# Patient Record
Sex: FEMALE | Race: WHITE | NOT HISPANIC OR LATINO | URBAN - METROPOLITAN AREA
[De-identification: names, ages, dates, MRNs, and addresses within clinical notes are randomized per-mention and may not be internally consistent; named-entity substitution may affect disease eponyms.]

---

## 2018-07-31 ENCOUNTER — INPATIENT (INPATIENT)
Facility: HOSPITAL | Age: 38
LOS: 2 days | Discharge: HOME | End: 2018-08-03
Attending: OBSTETRICS & GYNECOLOGY | Admitting: OBSTETRICS & GYNECOLOGY
Payer: COMMERCIAL

## 2018-07-31 VITALS — HEIGHT: 70 IN | WEIGHT: 182.98 LBS

## 2018-07-31 NOTE — PATIENT PROFILE OB - NS PRO ABUSE SCREEN SUSPICION NEGLECT YN
yes/Tarsorrhaphy of right eye, open CSF leak repair, abdominal fat graft, lumbar drain insertion, 3/6/17 craniotomy with EVD placement no

## 2018-08-01 LAB
BASOPHILS NFR BLD AUTO: 0.2 % — SIGNIFICANT CHANGE UP (ref 0–2)
EOSINOPHIL NFR BLD AUTO: 0.7 % — SIGNIFICANT CHANGE UP (ref 0–6)
HCT VFR BLD CALC: 32.1 % — LOW (ref 34.5–45)
HGB BLD-MCNC: 10.9 G/DL — LOW (ref 11.5–15.5)
LYMPHOCYTES # BLD AUTO: 13.5 % — SIGNIFICANT CHANGE UP (ref 13–44)
MCHC RBC-ENTMCNC: 29.9 PG — SIGNIFICANT CHANGE UP (ref 27–34)
MCHC RBC-ENTMCNC: 34 G/DL — SIGNIFICANT CHANGE UP (ref 32–36)
MCV RBC AUTO: 88.2 FL — SIGNIFICANT CHANGE UP (ref 80–100)
MONOCYTES NFR BLD AUTO: 7.9 % — SIGNIFICANT CHANGE UP (ref 2–14)
NEUTROPHILS NFR BLD AUTO: 77.7 % — HIGH (ref 43–77)
PLATELET # BLD AUTO: 106 K/UL — LOW (ref 150–400)
RBC # BLD: 3.64 M/UL — LOW (ref 3.8–5.2)
RBC # FLD: 13.1 % — SIGNIFICANT CHANGE UP (ref 10.3–16.9)
T PALLIDUM AB TITR SER: NEGATIVE — SIGNIFICANT CHANGE UP
WBC # BLD: 10.2 K/UL — SIGNIFICANT CHANGE UP (ref 3.8–10.5)
WBC # FLD AUTO: 10.2 K/UL — SIGNIFICANT CHANGE UP (ref 3.8–10.5)

## 2018-08-01 RX ORDER — AER TRAVELER 0.5 G/1
1 SOLUTION RECTAL; TOPICAL EVERY 4 HOURS
Qty: 0 | Refills: 0 | Status: DISCONTINUED | OUTPATIENT
Start: 2018-08-01 | End: 2018-08-03

## 2018-08-01 RX ORDER — OXYTOCIN 10 UNIT/ML
41.67 VIAL (ML) INJECTION
Qty: 20 | Refills: 0 | Status: DISCONTINUED | OUTPATIENT
Start: 2018-08-01 | End: 2018-08-03

## 2018-08-01 RX ORDER — DOCUSATE SODIUM 100 MG
100 CAPSULE ORAL
Qty: 0 | Refills: 0 | Status: DISCONTINUED | OUTPATIENT
Start: 2018-08-01 | End: 2018-08-03

## 2018-08-01 RX ORDER — SODIUM CHLORIDE 9 MG/ML
1000 INJECTION, SOLUTION INTRAVENOUS
Qty: 0 | Refills: 0 | Status: DISCONTINUED | OUTPATIENT
Start: 2018-08-01 | End: 2018-08-01

## 2018-08-01 RX ORDER — ACETAMINOPHEN 500 MG
650 TABLET ORAL EVERY 6 HOURS
Qty: 0 | Refills: 0 | Status: DISCONTINUED | OUTPATIENT
Start: 2018-08-01 | End: 2018-08-03

## 2018-08-01 RX ORDER — OXYCODONE AND ACETAMINOPHEN 5; 325 MG/1; MG/1
2 TABLET ORAL EVERY 6 HOURS
Qty: 0 | Refills: 0 | Status: DISCONTINUED | OUTPATIENT
Start: 2018-08-01 | End: 2018-08-03

## 2018-08-01 RX ORDER — DIBUCAINE 1 %
1 OINTMENT (GRAM) RECTAL EVERY 4 HOURS
Qty: 0 | Refills: 0 | Status: DISCONTINUED | OUTPATIENT
Start: 2018-08-01 | End: 2018-08-03

## 2018-08-01 RX ORDER — DINOPROSTONE 10 MG/241MG
10 INSERT VAGINAL ONCE
Qty: 0 | Refills: 0 | Status: COMPLETED | OUTPATIENT
Start: 2018-08-01 | End: 2018-08-01

## 2018-08-01 RX ORDER — HYDROCORTISONE 1 %
1 OINTMENT (GRAM) TOPICAL EVERY 4 HOURS
Qty: 0 | Refills: 0 | Status: DISCONTINUED | OUTPATIENT
Start: 2018-08-01 | End: 2018-08-03

## 2018-08-01 RX ORDER — MAGNESIUM HYDROXIDE 400 MG/1
30 TABLET, CHEWABLE ORAL
Qty: 0 | Refills: 0 | Status: DISCONTINUED | OUTPATIENT
Start: 2018-08-01 | End: 2018-08-03

## 2018-08-01 RX ORDER — OXYTOCIN 10 UNIT/ML
333.33 VIAL (ML) INJECTION
Qty: 20 | Refills: 0 | Status: DISCONTINUED | OUTPATIENT
Start: 2018-08-01 | End: 2018-08-01

## 2018-08-01 RX ORDER — DIPHENHYDRAMINE HCL 50 MG
25 CAPSULE ORAL EVERY 6 HOURS
Qty: 0 | Refills: 0 | Status: DISCONTINUED | OUTPATIENT
Start: 2018-08-01 | End: 2018-08-03

## 2018-08-01 RX ORDER — CITRIC ACID/SODIUM CITRATE 300-500 MG
15 SOLUTION, ORAL ORAL EVERY 4 HOURS
Qty: 0 | Refills: 0 | Status: DISCONTINUED | OUTPATIENT
Start: 2018-08-01 | End: 2018-08-01

## 2018-08-01 RX ORDER — LANOLIN
1 OINTMENT (GRAM) TOPICAL EVERY 6 HOURS
Qty: 0 | Refills: 0 | Status: DISCONTINUED | OUTPATIENT
Start: 2018-08-01 | End: 2018-08-03

## 2018-08-01 RX ORDER — TETANUS TOXOID, REDUCED DIPHTHERIA TOXOID AND ACELLULAR PERTUSSIS VACCINE, ADSORBED 5; 2.5; 8; 8; 2.5 [IU]/.5ML; [IU]/.5ML; UG/.5ML; UG/.5ML; UG/.5ML
0.5 SUSPENSION INTRAMUSCULAR ONCE
Qty: 0 | Refills: 0 | Status: DISCONTINUED | OUTPATIENT
Start: 2018-08-01 | End: 2018-08-03

## 2018-08-01 RX ORDER — IBUPROFEN 200 MG
600 TABLET ORAL EVERY 6 HOURS
Qty: 0 | Refills: 0 | Status: DISCONTINUED | OUTPATIENT
Start: 2018-08-01 | End: 2018-08-03

## 2018-08-01 RX ORDER — SODIUM CHLORIDE 9 MG/ML
1000 INJECTION, SOLUTION INTRAVENOUS ONCE
Qty: 0 | Refills: 0 | Status: COMPLETED | OUTPATIENT
Start: 2018-08-01 | End: 2018-08-01

## 2018-08-01 RX ORDER — GLYCERIN ADULT
1 SUPPOSITORY, RECTAL RECTAL AT BEDTIME
Qty: 0 | Refills: 0 | Status: DISCONTINUED | OUTPATIENT
Start: 2018-08-01 | End: 2018-08-03

## 2018-08-01 RX ORDER — SODIUM CHLORIDE 9 MG/ML
3 INJECTION INTRAMUSCULAR; INTRAVENOUS; SUBCUTANEOUS EVERY 8 HOURS
Qty: 0 | Refills: 0 | Status: DISCONTINUED | OUTPATIENT
Start: 2018-08-01 | End: 2018-08-03

## 2018-08-01 RX ORDER — SIMETHICONE 80 MG/1
80 TABLET, CHEWABLE ORAL EVERY 6 HOURS
Qty: 0 | Refills: 0 | Status: DISCONTINUED | OUTPATIENT
Start: 2018-08-01 | End: 2018-08-03

## 2018-08-01 RX ORDER — PRAMOXINE HYDROCHLORIDE 150 MG/15G
1 AEROSOL, FOAM RECTAL EVERY 4 HOURS
Qty: 0 | Refills: 0 | Status: DISCONTINUED | OUTPATIENT
Start: 2018-08-01 | End: 2018-08-03

## 2018-08-01 RX ADMIN — Medication 125 MILLIUNIT(S)/MIN: at 19:06

## 2018-08-01 RX ADMIN — SODIUM CHLORIDE 125 MILLILITER(S): 9 INJECTION, SOLUTION INTRAVENOUS at 05:11

## 2018-08-01 RX ADMIN — DINOPROSTONE 10 MILLIGRAM(S): 10 INSERT VAGINAL at 01:27

## 2018-08-01 RX ADMIN — SODIUM CHLORIDE 3 MILLILITER(S): 9 INJECTION INTRAMUSCULAR; INTRAVENOUS; SUBCUTANEOUS at 22:00

## 2018-08-01 RX ADMIN — SODIUM CHLORIDE 125 MILLILITER(S): 9 INJECTION, SOLUTION INTRAVENOUS at 14:32

## 2018-08-01 RX ADMIN — SODIUM CHLORIDE 125 MILLILITER(S): 9 INJECTION, SOLUTION INTRAVENOUS at 01:22

## 2018-08-01 RX ADMIN — Medication 600 MILLIGRAM(S): at 20:39

## 2018-08-01 RX ADMIN — SODIUM CHLORIDE 2000 MILLILITER(S): 9 INJECTION, SOLUTION INTRAVENOUS at 09:33

## 2018-08-02 ENCOUNTER — TRANSCRIPTION ENCOUNTER (OUTPATIENT)
Age: 38
End: 2018-08-02

## 2018-08-02 RX ADMIN — Medication 600 MILLIGRAM(S): at 10:09

## 2018-08-02 RX ADMIN — Medication 1 TABLET(S): at 15:02

## 2018-08-02 RX ADMIN — Medication 600 MILLIGRAM(S): at 03:14

## 2018-08-02 RX ADMIN — Medication 600 MILLIGRAM(S): at 09:46

## 2018-08-02 RX ADMIN — Medication 100 MILLIGRAM(S): at 09:48

## 2018-08-02 RX ADMIN — Medication 100 MILLIGRAM(S): at 23:33

## 2018-08-02 RX ADMIN — Medication 600 MILLIGRAM(S): at 15:29

## 2018-08-02 RX ADMIN — Medication 600 MILLIGRAM(S): at 03:03

## 2018-08-02 RX ADMIN — Medication 600 MILLIGRAM(S): at 22:27

## 2018-08-02 RX ADMIN — Medication 600 MILLIGRAM(S): at 15:02

## 2018-08-02 RX ADMIN — Medication 600 MILLIGRAM(S): at 23:28

## 2018-08-02 NOTE — LACTATION INITIAL EVALUATION - NS LACT CON REASON FOR REQ
general questions without assessment/Baby ~19 hrs old, currently in the nursery. Mother reports baby has fed well since delivery. Breastfeeding basics reviewed. Mother declined hands-on help at this time. Encouraged continued skin-to-skin and rooming-in and for the parents to call for further assistance as needed. RN to perform latch check when applicable. Baby 39 GA, 2 urine/2 stool diapers, no recorded weight loss. Mother has h/o breastfeeding previous infant for 1 year with no complications./multiparous mom

## 2018-08-02 NOTE — DISCHARGE NOTE OB - CARE PROVIDER_API CALL
Denice Vaz (), Obstetrics and Gynecology  [address] [place] [address], [place] [address]  Phone: [phone]  Fax: [phone]

## 2018-08-02 NOTE — DISCHARGE NOTE OB - CARE PLAN
Principal Discharge DX:	Normal delivery  Goal:	Normal recovery  Assessment and plan of treatment:	Normal recovery, bonding, nursing

## 2018-08-02 NOTE — PROGRESS NOTE ADULT - ASSESSMENT
A/P yo 38y  s/p , PP# 1, stable  1. Pain: OPM  2. GI: Reg  3. :  Voiding  4. DVT prophylaxis: SCDs, ambulation  5. Dispo: PP#2

## 2018-08-02 NOTE — PROGRESS NOTE ADULT - SUBJECTIVE AND OBJECTIVE BOX
Patient is feeling well. Nursing is going well. No heavy bleeding. Ambulating. Ready to go home tomorrow. See me in the office in 6 weeks.

## 2018-08-02 NOTE — DISCHARGE NOTE OB - PATIENT PORTAL LINK FT
You can access the [facility] Patient Portal, offered by [facility], by registering with the following website: [email or web address]

## 2018-08-02 NOTE — PROGRESS NOTE ADULT - SUBJECTIVE AND OBJECTIVE BOX
Patient evaluated at bedside.   She reports pain is well controlled with motrin and tylenol.    She has been ambulating without assistance, voiding spontaneously, and is breastfeeding.    She denies HA, dizziness, chest pain, palpitations, shortness of breath, n/v, heavy vaginal bleeding or perineal discomfort.    Physical Exam:  Vital Signs Last 24 Hrs  T(C): 37.2 (01 Aug 2018 22:21), Max: 37.4 (01 Aug 2018 20:45)  T(F): 98.9 (01 Aug 2018 22:21), Max: 99.4 (01 Aug 2018 20:45)  HR: 72 (01 Aug 2018 22:21) (69 - 84)  BP: 90/50 (01 Aug 2018 22:21) (90/50 - 111/54)  BP(mean): --  RR: 18 (01 Aug 2018 22:21) (18 - 18)  SpO2: 98% (01 Aug 2018 22:21) (98% - 98%)    GA: NAD, A+0 x 3  Abd: + BS, soft, nontender, nondistended, no rebound or guarding, uterus firm at midline  : lochia WNL  Extremities: no swelling or calf tenderness                          10.9   10.2  )-----------( 106      ( 01 Aug 2018 00:53 )             32.1       MEDICATIONS  (STANDING):  diphtheria/tetanus/pertussis (acellular) Vaccine (ADAcel) 0.5 milliLiter(s) IntraMuscular once  fentaNYL (2 MICROgram(s)/mL) + BUpivacaine 0.1% in 0.9% Sodium Chloride PCEA 250 milliLiter(s) Epidural PCA Continuous  oxytocin Infusion 41.667 milliUNIT(s)/Min (125 mL/Hr) IV Continuous <Continuous>  prenatal multivitamin 1 Tablet(s) Oral daily  sodium chloride 0.9% lock flush 3 milliLiter(s) IV Push every 8 hours    MEDICATIONS  (PRN):  acetaminophen   Tablet 650 milliGRAM(s) Oral every 6 hours PRN For Temp greater than 38.5 C (101.3 F)  acetaminophen   Tablet. 650 milliGRAM(s) Oral every 6 hours PRN Mild Pain  dibucaine 1% Ointment 1 Application(s) Topical every 4 hours PRN Perineal Discomfort  diphenhydrAMINE   Capsule 25 milliGRAM(s) Oral every 6 hours PRN Itching  docusate sodium 100 milliGRAM(s) Oral two times a day PRN Stool Softening  glycerin Suppository - Adult 1 Suppository(s) Rectal at bedtime PRN Constipation  hydrocortisone 1% Cream 1 Application(s) Topical every 4 hours PRN Moderate to Severe Perineal Pain  ibuprofen  Tablet 600 milliGRAM(s) Oral every 6 hours PRN Moderate Pain  lanolin Ointment 1 Application(s) Topical every 6 hours PRN Sore Nipples  magnesium hydroxide Suspension 30 milliLiter(s) Oral two times a day PRN Constipation  oxyCODONE    5 mG/acetaminophen 325 mG 2 Tablet(s) Oral every 6 hours PRN Severe Pain  pramoxine 1%/zinc 5% Cream 1 Application(s) Topical every 4 hours PRN Moderate to Severe Perineal Pain  simethicone 80 milliGRAM(s) Chew every 6 hours PRN Gas  witch hazel Pads 1 Application(s) Topical every 4 hours PRN Perineal Discomfort

## 2018-08-03 VITALS
TEMPERATURE: 99 F | HEART RATE: 93 BPM | OXYGEN SATURATION: 98 % | SYSTOLIC BLOOD PRESSURE: 94 MMHG | RESPIRATION RATE: 18 BRPM | DIASTOLIC BLOOD PRESSURE: 59 MMHG

## 2018-08-03 PROCEDURE — 86850 RBC ANTIBODY SCREEN: CPT

## 2018-08-03 PROCEDURE — 86901 BLOOD TYPING SEROLOGIC RH(D): CPT

## 2018-08-03 PROCEDURE — 36415 COLL VENOUS BLD VENIPUNCTURE: CPT

## 2018-08-03 PROCEDURE — 85025 COMPLETE CBC W/AUTO DIFF WBC: CPT

## 2018-08-03 PROCEDURE — 86900 BLOOD TYPING SEROLOGIC ABO: CPT

## 2018-08-03 PROCEDURE — 86780 TREPONEMA PALLIDUM: CPT

## 2018-08-03 RX ADMIN — Medication 600 MILLIGRAM(S): at 06:43

## 2018-08-03 RX ADMIN — Medication 600 MILLIGRAM(S): at 12:51

## 2018-08-03 RX ADMIN — Medication 100 MILLIGRAM(S): at 12:51

## 2018-08-03 RX ADMIN — Medication 600 MILLIGRAM(S): at 07:24

## 2018-08-03 RX ADMIN — Medication 600 MILLIGRAM(S): at 13:07

## 2018-08-03 NOTE — PROGRESS NOTE ADULT - SUBJECTIVE AND OBJECTIVE BOX
Patient evaluated at bedside.   She reports pain is well controlled with motrin.    She has been ambulating without assistance, voiding spontaneously, and is breastfeeding.    She denies HA, dizziness, chest pain, palpitations, shortness of breath, n/v, heavy vaginal bleeding or perineal discomfort.    Physical Exam:  Vital Signs Last 24 Hrs  T(C): 36.8 (02 Aug 2018 18:09), Max: 36.8 (02 Aug 2018 18:09)  T(F): 98.3 (02 Aug 2018 18:09), Max: 98.3 (02 Aug 2018 18:09)  HR: 88 (02 Aug 2018 18:09) (88 - 88)  BP: 111/56 (02 Aug 2018 18:09) (111/56 - 111/56)  BP(mean): --  RR: 18 (02 Aug 2018 18:09) (18 - 18)  SpO2: 98% (02 Aug 2018 18:09) (98% - 98%)    GA: NAD, A+0 x 3  Abd: + BS, soft, nontender, nondistended, no rebound or guarding, uterus firm at midline 3 fb below umbilicus  : lochia WNL  Extremities: no swelling or calf tenderness    MEDICATIONS  (STANDING):  diphtheria/tetanus/pertussis (acellular) Vaccine (ADAcel) 0.5 milliLiter(s) IntraMuscular once  fentaNYL (2 MICROgram(s)/mL) + BUpivacaine 0.1% in 0.9% Sodium Chloride PCEA 250 milliLiter(s) Epidural PCA Continuous  oxytocin Infusion 41.667 milliUNIT(s)/Min (125 mL/Hr) IV Continuous <Continuous>  prenatal multivitamin 1 Tablet(s) Oral daily  sodium chloride 0.9% lock flush 3 milliLiter(s) IV Push every 8 hours    MEDICATIONS  (PRN):  acetaminophen   Tablet 650 milliGRAM(s) Oral every 6 hours PRN For Temp greater than 38.5 C (101.3 F)  acetaminophen   Tablet. 650 milliGRAM(s) Oral every 6 hours PRN Mild Pain  dibucaine 1% Ointment 1 Application(s) Topical every 4 hours PRN Perineal Discomfort  diphenhydrAMINE   Capsule 25 milliGRAM(s) Oral every 6 hours PRN Itching  docusate sodium 100 milliGRAM(s) Oral two times a day PRN Stool Softening  glycerin Suppository - Adult 1 Suppository(s) Rectal at bedtime PRN Constipation  hydrocortisone 1% Cream 1 Application(s) Topical every 4 hours PRN Moderate to Severe Perineal Pain  ibuprofen  Tablet 600 milliGRAM(s) Oral every 6 hours PRN Moderate Pain  lanolin Ointment 1 Application(s) Topical every 6 hours PRN Sore Nipples  magnesium hydroxide Suspension 30 milliLiter(s) Oral two times a day PRN Constipation  oxyCODONE    5 mG/acetaminophen 325 mG 2 Tablet(s) Oral every 6 hours PRN Severe Pain  pramoxine 1%/zinc 5% Cream 1 Application(s) Topical every 4 hours PRN Moderate to Severe Perineal Pain  simethicone 80 milliGRAM(s) Chew every 6 hours PRN Gas  witch hazel Pads 1 Application(s) Topical every 4 hours PRN Perineal Discomfort

## 2018-08-03 NOTE — PROGRESS NOTE ADULT - ASSESSMENT
A/P yo 38y  s/p , PP#2 , gestational thrmobocytopenia, stable  1. Pain: OPM  2. GI: Reg  3. :  Voiding  4. DVT prophylaxis: SCDs, ambulation  5. Dispo: PP#2

## 2018-08-09 DIAGNOSIS — D69.49 OTHER PRIMARY THROMBOCYTOPENIA: ICD-10-CM

## 2018-08-09 DIAGNOSIS — Z34.83 ENCOUNTER FOR SUPERVISION OF OTHER NORMAL PREGNANCY, THIRD TRIMESTER: ICD-10-CM

## 2018-08-09 DIAGNOSIS — Z3A.39 39 WEEKS GESTATION OF PREGNANCY: ICD-10-CM

## 2022-10-07 NOTE — PATIENT PROFILE OB - PRO ANTIBODY SCREEN
Confirmed with   
Your current Orthopaedic problem we are working together to treat is:  Right knee osteoarthritis.    INJECTION  You have received an injection of Cortisone to your Right knee. Common symptoms of mild swelling and pain may accompany the injection for 24-48 hours. Ice and rest will typically reduce the symptoms. Redness, fever, or intense swelling are not typical. If this occurs, you should call the office for further advice.     It is recommended you schedule a follow-up appointment with Jono Llanos MD in 4 months as needed.    Office hours are 8:00 am to 5:00 pm Monday through Friday. If it is urgent that you speak with someone outside of these hours, our [facility] Call Center will be able to assist you. You can reach the office by calling the:    [facility] [address]  [address] [phone]    We do highly recommend [facility] with [facility], if you do not already have this. You can request access via the internet or by simply talking with a  at any of the clinics.   [email or web address]    Thank you for choosing [facility] as your Orthopedic provider!    
negative

## 2023-10-22 ENCOUNTER — EMERGENCY (EMERGENCY)
Facility: HOSPITAL | Age: 43
LOS: 1 days | Discharge: HOME | End: 2023-10-22
Admitting: EMERGENCY MEDICINE
Payer: COMMERCIAL

## 2023-10-22 ENCOUNTER — NON-APPOINTMENT (OUTPATIENT)
Age: 43
End: 2023-10-22

## 2023-10-22 VITALS
HEART RATE: 97 BPM | DIASTOLIC BLOOD PRESSURE: 73 MMHG | RESPIRATION RATE: 18 BRPM | SYSTOLIC BLOOD PRESSURE: 130 MMHG | WEIGHT: 138.01 LBS | TEMPERATURE: 98 F | OXYGEN SATURATION: 98 % | HEIGHT: 70 IN

## 2023-10-22 VITALS
SYSTOLIC BLOOD PRESSURE: 107 MMHG | RESPIRATION RATE: 18 BRPM | OXYGEN SATURATION: 98 % | HEART RATE: 80 BPM | DIASTOLIC BLOOD PRESSURE: 66 MMHG | TEMPERATURE: 98 F

## 2023-10-22 DIAGNOSIS — R20.2 PARESTHESIA OF SKIN: ICD-10-CM

## 2023-10-22 DIAGNOSIS — R20.0 ANESTHESIA OF SKIN: ICD-10-CM

## 2023-10-22 DIAGNOSIS — D72.829 ELEVATED WHITE BLOOD CELL COUNT, UNSPECIFIED: ICD-10-CM

## 2023-10-22 LAB
ANION GAP SERPL CALC-SCNC: 8 MMOL/L — SIGNIFICANT CHANGE UP (ref 5–17)
ANION GAP SERPL CALC-SCNC: 8 MMOL/L — SIGNIFICANT CHANGE UP (ref 5–17)
BASOPHILS # BLD AUTO: 0.05 K/UL — SIGNIFICANT CHANGE UP (ref 0–0.2)
BASOPHILS # BLD AUTO: 0.05 K/UL — SIGNIFICANT CHANGE UP (ref 0–0.2)
BASOPHILS NFR BLD AUTO: 0.4 % — SIGNIFICANT CHANGE UP (ref 0–2)
BASOPHILS NFR BLD AUTO: 0.4 % — SIGNIFICANT CHANGE UP (ref 0–2)
BUN SERPL-MCNC: 11 MG/DL — SIGNIFICANT CHANGE UP (ref 7–23)
BUN SERPL-MCNC: 11 MG/DL — SIGNIFICANT CHANGE UP (ref 7–23)
CALCIUM SERPL-MCNC: 9.3 MG/DL — SIGNIFICANT CHANGE UP (ref 8.4–10.5)
CALCIUM SERPL-MCNC: 9.3 MG/DL — SIGNIFICANT CHANGE UP (ref 8.4–10.5)
CHLORIDE SERPL-SCNC: 102 MMOL/L — SIGNIFICANT CHANGE UP (ref 96–108)
CHLORIDE SERPL-SCNC: 102 MMOL/L — SIGNIFICANT CHANGE UP (ref 96–108)
CO2 SERPL-SCNC: 24 MMOL/L — SIGNIFICANT CHANGE UP (ref 22–31)
CO2 SERPL-SCNC: 24 MMOL/L — SIGNIFICANT CHANGE UP (ref 22–31)
CREAT SERPL-MCNC: 0.7 MG/DL — SIGNIFICANT CHANGE UP (ref 0.5–1.3)
CREAT SERPL-MCNC: 0.7 MG/DL — SIGNIFICANT CHANGE UP (ref 0.5–1.3)
EGFR: 111 ML/MIN/1.73M2 — SIGNIFICANT CHANGE UP
EGFR: 111 ML/MIN/1.73M2 — SIGNIFICANT CHANGE UP
EOSINOPHIL # BLD AUTO: 0.01 K/UL — SIGNIFICANT CHANGE UP (ref 0–0.5)
EOSINOPHIL # BLD AUTO: 0.01 K/UL — SIGNIFICANT CHANGE UP (ref 0–0.5)
EOSINOPHIL NFR BLD AUTO: 0.1 % — SIGNIFICANT CHANGE UP (ref 0–6)
EOSINOPHIL NFR BLD AUTO: 0.1 % — SIGNIFICANT CHANGE UP (ref 0–6)
GLUCOSE SERPL-MCNC: 129 MG/DL — HIGH (ref 70–99)
GLUCOSE SERPL-MCNC: 129 MG/DL — HIGH (ref 70–99)
HCT VFR BLD CALC: 38.5 % — SIGNIFICANT CHANGE UP (ref 34.5–45)
HCT VFR BLD CALC: 38.5 % — SIGNIFICANT CHANGE UP (ref 34.5–45)
HGB BLD-MCNC: 13.5 G/DL — SIGNIFICANT CHANGE UP (ref 11.5–15.5)
HGB BLD-MCNC: 13.5 G/DL — SIGNIFICANT CHANGE UP (ref 11.5–15.5)
IMM GRANULOCYTES NFR BLD AUTO: 0.3 % — SIGNIFICANT CHANGE UP (ref 0–0.9)
IMM GRANULOCYTES NFR BLD AUTO: 0.3 % — SIGNIFICANT CHANGE UP (ref 0–0.9)
LYMPHOCYTES # BLD AUTO: 0.68 K/UL — LOW (ref 1–3.3)
LYMPHOCYTES # BLD AUTO: 0.68 K/UL — LOW (ref 1–3.3)
LYMPHOCYTES # BLD AUTO: 6.1 % — LOW (ref 13–44)
LYMPHOCYTES # BLD AUTO: 6.1 % — LOW (ref 13–44)
MAGNESIUM SERPL-MCNC: 1.8 MG/DL — SIGNIFICANT CHANGE UP (ref 1.6–2.6)
MAGNESIUM SERPL-MCNC: 1.8 MG/DL — SIGNIFICANT CHANGE UP (ref 1.6–2.6)
MCHC RBC-ENTMCNC: 31.3 PG — SIGNIFICANT CHANGE UP (ref 27–34)
MCHC RBC-ENTMCNC: 31.3 PG — SIGNIFICANT CHANGE UP (ref 27–34)
MCHC RBC-ENTMCNC: 35.1 GM/DL — SIGNIFICANT CHANGE UP (ref 32–36)
MCHC RBC-ENTMCNC: 35.1 GM/DL — SIGNIFICANT CHANGE UP (ref 32–36)
MCV RBC AUTO: 89.1 FL — SIGNIFICANT CHANGE UP (ref 80–100)
MCV RBC AUTO: 89.1 FL — SIGNIFICANT CHANGE UP (ref 80–100)
MONOCYTES # BLD AUTO: 0.38 K/UL — SIGNIFICANT CHANGE UP (ref 0–0.9)
MONOCYTES # BLD AUTO: 0.38 K/UL — SIGNIFICANT CHANGE UP (ref 0–0.9)
MONOCYTES NFR BLD AUTO: 3.4 % — SIGNIFICANT CHANGE UP (ref 2–14)
MONOCYTES NFR BLD AUTO: 3.4 % — SIGNIFICANT CHANGE UP (ref 2–14)
NEUTROPHILS # BLD AUTO: 10.01 K/UL — HIGH (ref 1.8–7.4)
NEUTROPHILS # BLD AUTO: 10.01 K/UL — HIGH (ref 1.8–7.4)
NEUTROPHILS NFR BLD AUTO: 89.7 % — HIGH (ref 43–77)
NEUTROPHILS NFR BLD AUTO: 89.7 % — HIGH (ref 43–77)
NRBC # BLD: 0 /100 WBCS — SIGNIFICANT CHANGE UP (ref 0–0)
NRBC # BLD: 0 /100 WBCS — SIGNIFICANT CHANGE UP (ref 0–0)
PLATELET # BLD AUTO: 152 K/UL — SIGNIFICANT CHANGE UP (ref 150–400)
PLATELET # BLD AUTO: 152 K/UL — SIGNIFICANT CHANGE UP (ref 150–400)
POTASSIUM SERPL-MCNC: 4.1 MMOL/L — SIGNIFICANT CHANGE UP (ref 3.5–5.3)
POTASSIUM SERPL-MCNC: 4.1 MMOL/L — SIGNIFICANT CHANGE UP (ref 3.5–5.3)
POTASSIUM SERPL-SCNC: 4.1 MMOL/L — SIGNIFICANT CHANGE UP (ref 3.5–5.3)
POTASSIUM SERPL-SCNC: 4.1 MMOL/L — SIGNIFICANT CHANGE UP (ref 3.5–5.3)
RBC # BLD: 4.32 M/UL — SIGNIFICANT CHANGE UP (ref 3.8–5.2)
RBC # BLD: 4.32 M/UL — SIGNIFICANT CHANGE UP (ref 3.8–5.2)
RBC # FLD: 12.7 % — SIGNIFICANT CHANGE UP (ref 10.3–14.5)
RBC # FLD: 12.7 % — SIGNIFICANT CHANGE UP (ref 10.3–14.5)
SODIUM SERPL-SCNC: 134 MMOL/L — LOW (ref 135–145)
SODIUM SERPL-SCNC: 134 MMOL/L — LOW (ref 135–145)
WBC # BLD: 11.16 K/UL — HIGH (ref 3.8–10.5)
WBC # BLD: 11.16 K/UL — HIGH (ref 3.8–10.5)
WBC # FLD AUTO: 11.16 K/UL — HIGH (ref 3.8–10.5)
WBC # FLD AUTO: 11.16 K/UL — HIGH (ref 3.8–10.5)

## 2023-10-22 PROCEDURE — 36415 COLL VENOUS BLD VENIPUNCTURE: CPT

## 2023-10-22 PROCEDURE — 85025 COMPLETE CBC W/AUTO DIFF WBC: CPT

## 2023-10-22 PROCEDURE — 93010 ELECTROCARDIOGRAM REPORT: CPT

## 2023-10-22 PROCEDURE — 80048 BASIC METABOLIC PNL TOTAL CA: CPT

## 2023-10-22 PROCEDURE — 99283 EMERGENCY DEPT VISIT LOW MDM: CPT | Mod: 25

## 2023-10-22 PROCEDURE — 93005 ELECTROCARDIOGRAM TRACING: CPT

## 2023-10-22 PROCEDURE — 99284 EMERGENCY DEPT VISIT MOD MDM: CPT

## 2023-10-22 PROCEDURE — 83735 ASSAY OF MAGNESIUM: CPT

## 2023-10-22 NOTE — ED PROVIDER NOTE - PATIENT PORTAL LINK FT
You can access the FollowMyHealth Patient Portal offered by [facility] by registering at the following website: [email or web address]. By joining [facility]’s FollowMyHealth portal, you will also be able to view your health information using other applications (apps) compatible with our system.

## 2023-10-22 NOTE — ED PROVIDER NOTE - CARE PROVIDER_API CALL
Diana Conrad  Neurology  [address] [address]  Phone: [phone]  Fax: [phone]  Follow Up Time:

## 2023-10-22 NOTE — ED PROVIDER NOTE - CLINICAL SUMMARY MEDICAL DECISION MAKING FREE TEXT BOX
43-year-old female with no past medical history complaining of bilateral hands and feet heaviness/numbness over the past month.   Patient states when she wakes up in the morning she usually feels a heaviness in her hands and eventually goes away.  This morning she felt the numbness that was not going away and started to feel anxious her heart was racing and racing she got palpitations she felt nauseous and lightheadedness. Pts  gave her 0.5mg of xanax and after about 45 min she felt improvement. Denies fever, chills, cp, sob, n/v, dizziness. VSS. Exam unremarkable. Electrolytes normal. mildly elevated wbc, pt without infectious symptoms. EKG NSR. ?anxiety related. will refer to pcp and neuro 43-year-old female with no past medical history complaining of bilateral hands and feet heaviness/numbness over the past month.   Patient states when she wakes up in the morning she usually feels a heaviness in her hands and eventually goes away.  This morning she felt the numbness that was not going away and started to feel anxious her heart was racing and racing she got palpitations she felt nauseous and lightheadedness. Pts  gave her 0.5mg of xanax and after about 45 min she felt improvement. Denies fever, chills, cp, sob, n/v, dizziness. VSS. Exam unremarkable. Electrolytes normal. mildly elevated wbc, pt without infectious symptoms. EKG NSR. pt pt refusing CXR. ?anxiety related. will refer to pcp and neuro

## 2023-10-22 NOTE — ED ADULT NURSE NOTE - OBJECTIVE STATEMENT
Pt co numbness to b/l hands and feet x multiple wks, worsening today. Pt reports feeling anxious, took 0.5mg xanax PTA- still feeling anxious. denies headache, unilateral weakness, unsteady gait or decreased sensation.

## 2023-10-22 NOTE — ED PROVIDER NOTE - NSFOLLOWUPINSTRUCTIONS_ED_ALL_ED_FT
aresthesia  Paresthesia is an abnormal burning or prickling sensation. It is usually felt in the hands, arms, legs, or feet. However, it may occur in any part of the body. Usually, paresthesia is not painful. It may feel like:  Tingling or numbness.  Buzzing.  Itching.  Paresthesia may occur without any clear cause, or it may be caused by:  Breathing too quickly (hyperventilation).  Pressure on a nerve.  An underlying medical condition.  Side effects of a medicine.  Nutritional deficiencies.  Exposure to toxic chemicals.  Most people experience temporary (transient) paresthesia at some time in their lives. For some people, it may be long-lasting (chronic) because of an underlying medical condition. If you have paresthesia that lasts a long time, you need to be evaluated by your health care provider.    Follow these instructions at home:  Nutrition    A plate with examples of foods in a healthy diet.  Eat a healthy diet. This includes:  Eating foods that are high in fiber, such as beans, whole grains, and fresh fruits and vegetables.  Limiting foods that are high in fat and processed sugars, such as fried or sweet foods.  Alcohol use    A sign showing that a person should not drink alcohol.  Avoid or limit alcohol. Too much alcohol can cause a vitamin B deficiency, and vitamin B is needed for healthy nerves.  Do not drink alcohol if:  Your health care provider tells you not to drink.  You are pregnant, may be pregnant, or are planning to become pregnant.  If you drink alcohol:  Limit how much you have to:  0–1 drink a day for women.  0–2 drinks a day for men.  Know how much alcohol is in your drink. In the [place] one drink equals one 12 oz bottle of beer (355 mL), one 5 oz glass of wine (148 mL), or one 1½ oz glass of hard liquor (44 mL).  General instructions    Take over-the-counter and prescription medicines only as told by your health care provider.  Do not use any products that contain nicotine or tobacco. These products include cigarettes, chewing tobacco, and vaping devices, such as e-cigarettes. If you need help quitting, ask your health care provider.  If you have diabetes, work closely with your health care provider to keep your blood sugar under control.  If you have numbness in your feet:  Check every day for signs of injury or infection. Watch for redness, warmth, and swelling.  Wear padded socks and comfortable shoes. These help protect your feet.  Keep all follow-up visits. This is important.  Contact a health care provider if you:  Have paresthesia that gets worse or does not go away.  Have numbness after an injury.  Have a burning or prickling feeling that gets worse when you walk.  Have pain, cramps, or dizziness, or you faint.  Develop a rash.  Get help right away if you:  Feel muscle weakness.  Develop new weakness in an arm or leg.  Have trouble walking or moving.  Have problems with speech, understanding, or vision.  Feel confused.  Cannot control your bladder or bowel movements.  These symptoms may be an emergency. Get help right away. Call 911.  Do not wait to see if the symptoms will go away.  Do not drive yourself to the hospital.  Summary  Paresthesia is an abnormal burning or prickling sensation that is usually felt in the hands, arms, legs, or feet. It may also occur in other parts of the body.  Paresthesia may occur without any clear cause, or it may be caused by breathing too quickly (hyperventilation), pressure on a nerve, an underlying medical condition, side effects of a medicine, nutritional deficiencies, or exposure to toxic chemicals.  If you have paresthesia that lasts a long time, you need to be evaluated by your health care provider.  This information is not intended to replace advice given to you by your health care provider. Make sure you discuss any questions you have with your health care provider.    Document Revised: 08/29/2022 Document Reviewed: 08/29/2022

## 2023-10-22 NOTE — ED ADULT TRIAGE NOTE - CHIEF COMPLAINT QUOTE
Pt co numbness to b/l hands and feet x wks, worsening today. Pt reports feeling anxious, took 0.5mg xanax PTA- still feeling anxious.

## 2023-10-22 NOTE — ED PROVIDER NOTE - OBJECTIVE STATEMENT
43-year-old female with no past medical history complaining of bilateral hands and feet heaviness/numbness over the past month.   Patient states when she wakes up in the morning she usually feels a heaviness in her hands and eventually goes away.  This morning she felt the numbness that was not going away and started to feel anxious her heart was racing and racing she got palpitations she felt nauseous and lightheadedness. 43-year-old female with no past medical history complaining of bilateral hands and feet heaviness/numbness over the past month.   Patient states when she wakes up in the morning she usually feels a heaviness in her hands and eventually goes away.  This morning she felt the numbness that was not going away and started to feel anxious her heart was racing and racing she got palpitations she felt nauseous and lightheadedness. Pts  gave her 0.5mg of xanax and after about 45 min she felt improvement. Denies fever, chills, cp, sob, n/v, dizziness. Pt states in sept she was sick with cold symptoms for a few weeks, kept testing neg for covid at home. Pt  then saw her pcp who did blood work and she had high antibodies for EBV and covid. Since then pt has been reading about EBV and concerned she could have a neurological issue.

## 2024-08-06 ENCOUNTER — APPOINTMENT (OUTPATIENT)
Dept: OTOLARYNGOLOGY | Facility: CLINIC | Age: 44
End: 2024-08-06